# Patient Record
Sex: FEMALE | Race: WHITE | NOT HISPANIC OR LATINO | Employment: FULL TIME | URBAN - METROPOLITAN AREA
[De-identification: names, ages, dates, MRNs, and addresses within clinical notes are randomized per-mention and may not be internally consistent; named-entity substitution may affect disease eponyms.]

---

## 2023-10-06 ENCOUNTER — OFFICE VISIT (OUTPATIENT)
Dept: PRIMARY CARE | Facility: CLINIC | Age: 38
End: 2023-10-06
Payer: COMMERCIAL

## 2023-10-06 VITALS
DIASTOLIC BLOOD PRESSURE: 70 MMHG | TEMPERATURE: 97.6 F | HEART RATE: 67 BPM | SYSTOLIC BLOOD PRESSURE: 110 MMHG | OXYGEN SATURATION: 97 %

## 2023-10-06 DIAGNOSIS — J02.9 SORE THROAT: Primary | ICD-10-CM

## 2023-10-06 LAB — POC RAPID STREP: NEGATIVE

## 2023-10-06 PROCEDURE — 87880 STREP A ASSAY W/OPTIC: CPT | Performed by: FAMILY MEDICINE

## 2023-10-06 PROCEDURE — 87636 SARSCOV2 & INF A&B AMP PRB: CPT

## 2023-10-06 PROCEDURE — 99213 OFFICE O/P EST LOW 20 MIN: CPT | Performed by: FAMILY MEDICINE

## 2023-10-06 RX ORDER — DESOGESTREL/ETHINYL ESTRADIOL AND ETHINYL ESTRADIOL 21-5 (28)
1 KIT ORAL DAILY
COMMUNITY

## 2023-10-06 ASSESSMENT — ENCOUNTER SYMPTOMS
HEADACHES: 1
FEVER: 0
SORE THROAT: 1
COUGH: 0

## 2023-10-06 NOTE — PATIENT INSTRUCTIONS
Will send viral swabs, rapid strep was negative. Symptoms are improving, Continue supportive care.

## 2023-10-06 NOTE — PROGRESS NOTES
Assessment/Plan   ASSESSMENT/PLAN:      Dena was seen today for sore throat.  Diagnoses and all orders for this visit:  Sore throat (Primary)  -     POCT Rapid Strep A manually resulted  -     Sars-CoV-2 PCR, Symptomatic; Future  -     Cancel: POCT Influenza A/B manually resulted  -     Influenza A, and B PCR; Future  -     Influenza A, and B PCR  -     Sars-CoV-2 PCR, Symptomatic       Patient Instructions   Will send viral swabs, rapid strep was negative. Symptoms are improving, Continue supportive care.     Cedric Garcia DO     FUTURE DIRECTION:     Subjective   SUBJECTIVE:     Patient ID: Dena Prince is a 38 y.o. femalefor the following:    Her  and daughter both were sick   Sx started on 10/3/2023 with headache, fatigue, backache and sore throat   Denies fever or chills     Review of Systems   Constitutional:  Negative for fever.   HENT:  Positive for sore throat.    Respiratory:  Negative for cough.    Neurological:  Positive for headaches.       Allergies   Allergen Reactions    Neosporin (Neomycin-Polymyx) Hives    Drospirenone-Ethinyl Estradiol Unknown and Rash    Neomycin-Bacitracin-Polymyxin Rash    Tioconazole Rash and Swelling         Current Outpatient Medications:     Kariva, 28, 0.15-0.02 mgx21 /0.01 mg x 5 tablet, Take 1 tablet by mouth once daily., Disp: , Rfl:      There is no problem list on file for this patient.      Social History     Socioeconomic History    Marital status: Unknown     Spouse name: Not on file    Number of children: Not on file    Years of education: Not on file    Highest education level: Not on file   Occupational History    Not on file   Tobacco Use    Smoking status: Former     Packs/day: .5     Types: Cigarettes     Quit date:      Years since quittin.7    Smokeless tobacco: Not on file   Substance and Sexual Activity    Alcohol use: Never    Drug use: Never    Sexual activity: Not on file   Other Topics Concern    Not on file   Social  History Narrative    Not on file     Social Determinants of Health     Financial Resource Strain: Not on file   Food Insecurity: Not on file   Transportation Needs: Not on file   Physical Activity: Not on file   Stress: Not on file   Social Connections: Not on file   Intimate Partner Violence: Not on file   Housing Stability: Not on file       Objective   OBJECTIVE:     Vitals:    10/06/23 1120   BP: 110/70   Pulse: 67   Temp: 36.4 °C (97.6 °F)   SpO2: 97%       Exam      Physical Exam  Constitutional:       Appearance: Normal appearance.   HENT:      Head: Normocephalic.      Right Ear: There is impacted cerumen.      Left Ear: Tympanic membrane normal.      Nose: No congestion.      Mouth/Throat:      Pharynx: No oropharyngeal exudate or posterior oropharyngeal erythema.   Pulmonary:      Effort: Pulmonary effort is normal.   Musculoskeletal:      Cervical back: Normal range of motion.   Neurological:      Mental Status: She is alert.   Psychiatric:         Mood and Affect: Mood normal.

## 2023-10-07 LAB
FLUAV RNA RESP QL NAA+PROBE: NOT DETECTED
FLUBV RNA RESP QL NAA+PROBE: NOT DETECTED
SARS-COV-2 RNA RESP QL NAA+PROBE: DETECTED

## 2023-10-09 ENCOUNTER — TELEPHONE (OUTPATIENT)
Dept: PRIMARY CARE | Facility: CLINIC | Age: 38
End: 2023-10-09
Payer: COMMERCIAL

## 2023-10-09 NOTE — TELEPHONE ENCOUNTER
Pt requesting results of covid test.  Pt states she already saw results on portal and it shows detected.  Pt saw EOI but would like an answer today

## 2023-10-10 NOTE — TELEPHONE ENCOUNTER
Spoke to pt and informed of COVID results, pt would like ot know if she could get her COVID and FLU vaccines and if so, when? Please advise Thx

## 2023-10-10 NOTE — TELEPHONE ENCOUNTER
Recommend wait for COVID vaccine until 30 days after illness since will have natural immunity from current infection. Able to get flu shot once symptoms of current infection resolve.

## 2025-05-05 ASSESSMENT — ENCOUNTER SYMPTOMS
EYES NEGATIVE: 1
SORE THROAT: 0
DIZZINESS: 0
DIARRHEA: 0
DYSPHORIC MOOD: 0
BLOOD IN STOOL: 0
FEVER: 0
MUSCULOSKELETAL NEGATIVE: 1
NAUSEA: 0
NERVOUS/ANXIOUS: 0
HEADACHES: 0
SHORTNESS OF BREATH: 0
CONSTIPATION: 0
DIFFICULTY URINATING: 0
ABDOMINAL PAIN: 0
FATIGUE: 0
DYSURIA: 0
PALPITATIONS: 0
CHILLS: 0
COUGH: 0
VOMITING: 0

## 2025-05-06 ENCOUNTER — APPOINTMENT (OUTPATIENT)
Dept: PRIMARY CARE | Facility: CLINIC | Age: 40
End: 2025-05-06
Payer: COMMERCIAL

## 2025-05-06 VITALS
HEART RATE: 57 BPM | DIASTOLIC BLOOD PRESSURE: 60 MMHG | SYSTOLIC BLOOD PRESSURE: 108 MMHG | BODY MASS INDEX: 20.8 KG/M2 | HEIGHT: 62 IN | WEIGHT: 113 LBS | OXYGEN SATURATION: 99 % | TEMPERATURE: 97.9 F

## 2025-05-06 DIAGNOSIS — Z11.59 ENCOUNTER FOR HEPATITIS C SCREENING TEST FOR LOW RISK PATIENT: ICD-10-CM

## 2025-05-06 DIAGNOSIS — Z11.4 SCREENING FOR HIV (HUMAN IMMUNODEFICIENCY VIRUS): ICD-10-CM

## 2025-05-06 DIAGNOSIS — Z00.00 ROUTINE GENERAL MEDICAL EXAMINATION AT A HEALTH CARE FACILITY: Primary | ICD-10-CM

## 2025-05-06 PROCEDURE — 3008F BODY MASS INDEX DOCD: CPT | Performed by: PHYSICIAN ASSISTANT

## 2025-05-06 PROCEDURE — 99395 PREV VISIT EST AGE 18-39: CPT | Performed by: PHYSICIAN ASSISTANT

## 2025-05-06 RX ORDER — BUTYROSPERMUM PARKII(SHEA BUTTER), SIMMONDSIA CHINENSIS (JOJOBA) SEED OIL, ALOE BARBADENSIS LEAF EXTRACT .01; 1; 3.5 G/100G; G/100G; G/100G
250 LIQUID TOPICAL 2 TIMES DAILY
COMMUNITY

## 2025-05-06 RX ORDER — BISMUTH SUBSALICYLATE 262 MG
1 TABLET,CHEWABLE ORAL DAILY
COMMUNITY

## 2025-05-06 ASSESSMENT — PATIENT HEALTH QUESTIONNAIRE - PHQ9
SUM OF ALL RESPONSES TO PHQ9 QUESTIONS 1 AND 2: 0
1. LITTLE INTEREST OR PLEASURE IN DOING THINGS: NOT AT ALL
2. FEELING DOWN, DEPRESSED OR HOPELESS: NOT AT ALL

## 2025-05-06 NOTE — PROGRESS NOTES
"Subjective   Patient ID: Dena Prince is a 40 y.o. female who presents for Annual Exam.    HPI   Pt presents for complete physical.  Needs wellness exam for insurance.    No c/o.     Exercises 3x per week.  Eats healthy diet.     Saw OB/gyn Dr De León today (goes yearly).     Had biometric labs including cholesterol and sugar 2024 that were good.       Review of Systems   Constitutional:  Negative for chills, fatigue and fever.   HENT:  Negative for congestion, ear pain and sore throat.    Eyes: Negative.    Respiratory:  Negative for cough and shortness of breath.    Cardiovascular:  Negative for chest pain and palpitations.   Gastrointestinal:  Negative for abdominal pain, blood in stool, constipation, diarrhea, nausea and vomiting.   Genitourinary:  Negative for difficulty urinating and dysuria.   Musculoskeletal: Negative.    Skin:  Negative for rash.   Neurological:  Negative for dizziness and headaches.   Psychiatric/Behavioral:  Negative for dysphoric mood. The patient is not nervous/anxious.          History reviewed. No pertinent past medical history.   Family History   Problem Relation Name Age of Onset    Hyperlipidemia Mother      Blood clot Mother      Hyperlipidemia Father      Alcohol abuse Father      Heart disease Father      Ovarian cancer Maternal Grandmother      Stomach cancer Maternal Grandfather        Social History     Tobacco Use    Smoking status: Former     Current packs/day: 0.00     Types: Cigarettes     Quit date:      Years since quittin.3   Substance Use Topics    Alcohol use: Never    Drug use: Never        Objective   /60   Pulse 57   Temp 36.6 °C (97.9 °F)   Ht 1.575 m (5' 2\")   Wt 51.3 kg (113 lb)   SpO2 99%   BMI 20.67 kg/m²     Physical Exam  Vitals and nursing note reviewed.   Constitutional:       General: She is not in acute distress.     Appearance: Normal appearance.   HENT:      Head: Normocephalic and atraumatic.      Right Ear: Tympanic " membrane, ear canal and external ear normal.      Left Ear: Tympanic membrane, ear canal and external ear normal.      Nose: Nose normal.      Mouth/Throat:      Mouth: Mucous membranes are moist.      Pharynx: Oropharynx is clear.   Eyes:      General: No scleral icterus.     Extraocular Movements: Extraocular movements intact.      Pupils: Pupils are equal, round, and reactive to light.   Cardiovascular:      Rate and Rhythm: Normal rate and regular rhythm.   Pulmonary:      Effort: Pulmonary effort is normal.      Breath sounds: Normal breath sounds.   Abdominal:      Palpations: Abdomen is soft. There is no mass.      Tenderness: There is no abdominal tenderness.   Musculoskeletal:         General: Normal range of motion.      Cervical back: Neck supple. No tenderness.      Thoracic back: Normal. No scoliosis.      Lumbar back: Normal. No scoliosis.   Lymphadenopathy:      Cervical: No cervical adenopathy.   Skin:     General: Skin is warm and dry.   Neurological:      General: No focal deficit present.      Mental Status: She is alert.      Gait: Gait normal.   Psychiatric:         Mood and Affect: Mood normal.         Behavior: Behavior normal.         Assessment/Plan   Diagnoses and all orders for this visit:  Routine general medical examination at a health care facility  -     HIV 1/2 Antigen/Antibody Screen with Reflex to Confirmation; Future  -     Hepatitis C Antibody; Future  Screening for HIV (human immunodeficiency virus)  -     HIV 1/2 Antigen/Antibody Screen with Reflex to Confirmation; Future  Encounter for hepatitis C screening test for low risk patient  -     Hepatitis C Antibody; Future     Discussed wellness issues.  Get nonfasting labs for screening for HIV and hepatitis C (asymptomatic).  Advised to get me a copy of her most recent biometric screening.   Discussed diet and exercise.  Follow-up as needed.  Next well check in 1 year.